# Patient Record
Sex: MALE | Race: WHITE | HISPANIC OR LATINO
[De-identification: names, ages, dates, MRNs, and addresses within clinical notes are randomized per-mention and may not be internally consistent; named-entity substitution may affect disease eponyms.]

---

## 2019-07-16 ENCOUNTER — RX ONLY (OUTPATIENT)
Age: 29
Setting detail: RX ONLY
End: 2019-07-16

## 2019-07-16 RX ORDER — TRIAMCINOLONE ACETONIDE 1 MG/G
1 CREAM TOPICAL BID
Qty: 1 | Refills: 1 | Status: ERX | COMMUNITY
Start: 2019-07-16

## 2019-10-26 ENCOUNTER — APPOINTMENT (RX ONLY)
Dept: URBAN - METROPOLITAN AREA CLINIC 45 | Facility: CLINIC | Age: 29
Setting detail: DERMATOLOGY
End: 2019-10-26

## 2019-10-26 DIAGNOSIS — L50.8 OTHER URTICARIA: ICD-10-CM | Status: INADEQUATELY CONTROLLED

## 2019-10-26 PROCEDURE — ? TREATMENT REGIMEN

## 2019-10-26 PROCEDURE — ? COUNSELING

## 2019-10-26 PROCEDURE — ? FULL BODY SKIN EXAM - DECLINED

## 2019-10-26 PROCEDURE — ? PRESCRIPTION

## 2019-10-26 PROCEDURE — ? MEDICATION COUNSELING

## 2019-10-26 PROCEDURE — 99214 OFFICE O/P EST MOD 30 MIN: CPT

## 2019-10-26 RX ORDER — TRIAMCINOLONE ACETONIDE 1 MG/G
1 CREAM TOPICAL BID
Qty: 1 | Refills: 1 | Status: ERX

## 2019-10-26 ASSESSMENT — LOCATION DETAILED DESCRIPTION DERM
LOCATION DETAILED: LEFT LATERAL ABDOMEN
LOCATION DETAILED: RIGHT LATERAL ABDOMEN

## 2019-10-26 ASSESSMENT — LOCATION SIMPLE DESCRIPTION DERM: LOCATION SIMPLE: ABDOMEN

## 2019-10-26 ASSESSMENT — LOCATION ZONE DERM: LOCATION ZONE: TRUNK

## 2019-10-26 NOTE — HPI: RASH
What Type Of Note Output Would You Prefer (Optional)?: Bullet Format
How Severe Is Your Rash?: moderate
Is This A New Presentation, Or A Follow-Up?: Rash
Additional History: Last OV w/ [facility]. Patient has not seen CHARLEEN in past.  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   Hx Asteatotic derm on the arms tx w/ TAC 0.1% cream and hydroxyzine HCL 25mg.  Patient states Zyrtec helps rash, but after about 3 hours the rash returns.

## 2019-10-26 NOTE — PROCEDURE: MIPS QUALITY
Quality 431: Preventive Care And Screening: Unhealthy Alcohol Use - Screening: Patient screened for unhealthy alcohol use using a single question and scores less than 2 times per year
Quality 402: Tobacco Use And Help With Quitting Among Adolescents: Patient screened for tobacco and never smoked
Quality 110: Preventive Care And Screening: Influenza Immunization: Influenza immunization was not ordered or administered, reason not given
Quality 226: Preventive Care And Screening: Tobacco Use: Screening And Cessation Intervention: Patient screened for tobacco use and is an ex/non-smoker
Detail Level: Detailed
Quality 130: Documentation Of Current Medications In The Medical Record: Current Medications Documented

## 2019-10-26 NOTE — PROCEDURE: TREATMENT REGIMEN
Detail Level: Zone
Continue Regimen: Zyrtec 10mg 1 po BID x 1 month. Patient may take 2 po BID x 1 month if rash continues to worsen.

## 2019-10-26 NOTE — PROCEDURE: MEDICATION COUNSELING
Xelkatez Pregnancy And Lactation Text: This medication is Pregnancy Category D and is not considered safe during pregnancy.  The risk during breast feeding is also uncertain.

## 2019-10-26 NOTE — PROCEDURE: MEDICATION COUNSELING
yes High Dose Vitamin A Pregnancy And Lactation Text: High dose vitamin A therapy is contraindicated during pregnancy and breast feeding.

## 2020-05-05 ENCOUNTER — RX ONLY (OUTPATIENT)
Age: 30
Setting detail: RX ONLY
End: 2020-05-05

## 2020-05-05 RX ORDER — TACROLIMUS 1 MG/G
OINTMENT TOPICAL
Qty: 1 | Refills: 3 | Status: ERX | COMMUNITY
Start: 2020-05-05

## 2020-11-11 ENCOUNTER — APPOINTMENT (RX ONLY)
Dept: URBAN - METROPOLITAN AREA CLINIC 45 | Facility: CLINIC | Age: 30
Setting detail: DERMATOLOGY
End: 2020-11-11

## 2020-11-11 DIAGNOSIS — L20.89 OTHER ATOPIC DERMATITIS: ICD-10-CM | Status: INADEQUATELY CONTROLLED

## 2020-11-11 PROBLEM — L20.84 INTRINSIC (ALLERGIC) ECZEMA: Status: ACTIVE | Noted: 2020-11-11

## 2020-11-11 PROCEDURE — ? PRESCRIPTION MEDICATION MANAGEMENT

## 2020-11-11 PROCEDURE — ? MEDICATION COUNSELING

## 2020-11-11 PROCEDURE — ? FULL BODY SKIN EXAM - DECLINED

## 2020-11-11 PROCEDURE — ? ADDITIONAL NOTES

## 2020-11-11 PROCEDURE — ? PRESCRIPTION

## 2020-11-11 PROCEDURE — 99214 OFFICE O/P EST MOD 30 MIN: CPT

## 2020-11-11 PROCEDURE — ? COUNSELING

## 2020-11-11 RX ORDER — TRIAMCINOLONE ACETONIDE 1 MG/G
CREAM TOPICAL BID
Qty: 1 | Refills: 0 | Status: ERX

## 2020-11-11 RX ORDER — HYDROXYZINE HYDROCHLORIDE 25 MG/1
TABLET, FILM COATED ORAL
Qty: 30 | Refills: 2 | Status: ERX | COMMUNITY
Start: 2020-11-11

## 2020-11-11 RX ADMIN — HYDROXYZINE HYDROCHLORIDE: 25 TABLET, FILM COATED ORAL at 00:00

## 2020-11-11 ASSESSMENT — LOCATION SIMPLE DESCRIPTION DERM
LOCATION SIMPLE: RIGHT UPPER BACK
LOCATION SIMPLE: LEFT FOREARM
LOCATION SIMPLE: RIGHT FOREARM

## 2020-11-11 ASSESSMENT — LOCATION DETAILED DESCRIPTION DERM
LOCATION DETAILED: LEFT VENTRAL PROXIMAL FOREARM
LOCATION DETAILED: RIGHT SUPERIOR UPPER BACK
LOCATION DETAILED: RIGHT VENTRAL PROXIMAL FOREARM

## 2020-11-11 ASSESSMENT — LOCATION ZONE DERM
LOCATION ZONE: ARM
LOCATION ZONE: TRUNK

## 2020-11-11 NOTE — PROCEDURE: PRESCRIPTION MEDICATION MANAGEMENT
Detail Level: Zone
Render In Strict Bullet Format?: No
Otc Regimen: Sensitive dove, cerave, cetaphil. Allegra (one tab in the morning)

## 2020-11-11 NOTE — PROCEDURE: MEDICATION COUNSELING
no Niacinamide Counseling: I recommended taking niacin or niacinamide, also know as vitamin B3, twice daily. Recent evidence suggests that taking vitamin B3 (500 mg twice daily) can reduce the risk of actinic keratoses and non-melanoma skin cancers. Side effects of vitamin B3 include flushing and headache.

## 2020-11-11 NOTE — HPI: RASH
What Type Of Note Output Would You Prefer (Optional)?: Bullet Format
How Severe Is Your Rash?: mild
Is This A New Presentation, Or A Follow-Up?: Rash
Additional History: He has seasonal allergies\\nHe was treated for urticaria with dermatographism. \\nSaw CHARLEEN last

## 2023-11-13 NOTE — PROCEDURE: MEDICATION COUNSELING
Trigger point injections and occipital nerve block at greater and lesser branches    Date/Time: 11/13/2023 1:35 PM    Performed by: London Lucero DO  Authorized by: London Lucero DO    Consent:     Consent obtained:  Verbal    Consent given by:  Patient    Risks discussed:  Bleeding, infection, nerve damage, pain and poor cosmetic result    Alternatives discussed:  Alternative treatment  Universal protocol:     Procedure explained and questions answered to patient or proxy's satisfaction: yes      Immediately prior to procedure, a time out was called: yes      Patient identity confirmed:  Verbally with patient  Indications:     Indications:  Myofascial Syndrome and Occipital Neuralgia (greater and lesser branches)  Pre-procedure details:     Skin preparation:  ChloraPrep    Preparation: Patient was prepped and draped in the usual sterile fashion    Anesthesia:     Anesthesia method:  None  Procedure specific details:      bilateral occipital nerve (greater and lesser branches) identified based on landmark anatomy and palpation.   It was cleaned with chloraprep and dried.   After negative aspiration, 6 mL of lidocaine 2% was easily injected with 27G one inch needle at each for a total of 2 sites.  Injected in equal parts at each side.  Patient tolerated procedure without complication.   The patient noted numbness in the distribution of the nerve.      bilateral trigger points were identified based on anatomy/palpation.   This was located paracervical muscles, trapezius, rhomboids.    The area was cleared with chloraprep and dried.    After negative aspiration, 4 mL of lidocaine 2% was easily injected with 27G one inch needle at each for a total of 8 sites.  Injected in equal parts at each site.  Patient tolerated procedure without complication.   Patient noted decreased pain.    Lidocaine   NDC: [id number]  LOT: [id number]  Exp Date: 9/2026      Post-procedure details:     Procedure completion:  Tolerated well, no  immediate complications         Drysol Counseling:  I discussed with the patient the risks of drysol/aluminum chloride including but not limited to skin rash, itching, irritation, burning.

## 2024-05-09 NOTE — PROCEDURE: MEDICATION COUNSELING
-- DO NOT REPLY / DO NOT REPLY ALL --  -- Message is from [facility] ([facility]) --    Request Result  Is the patient currently having Emergent symptoms?: No    Which result are you requesting?: A1C    What is the full name of the provider that ordered the lab or test?: Pallavi Nunez    Caller Information         Type Contact Phone/Fax    05/09/2024 03:49 PM CDT Phone (Incoming) Dontrell Tan (Self) [phone] (M)            Alternative phone number: NONE    Clinic site name / Account # for ordering provider: [facility]    Can a detailed message be left?: Yes    Message Turnaround:         appointment   Doxycycline Counseling:  Patient counseled regarding possible photosensitivity and increased risk for sunburn.  Patient instructed to avoid sunlight, if possible.  When exposed to sunlight, patients should wear protective clothing, sunglasses, and sunscreen.  The patient was instructed to call the office immediately if the following severe adverse effects occur:  hearing changes, easy bruising/bleeding, severe headache, or vision changes.  The patient verbalized understanding of the proper use and possible adverse effects of doxycycline.  All of the patient's questions and concerns were addressed.

## 2024-06-05 NOTE — PROCEDURE: MEDICATION COUNSELING
Patient baseline mental status Topical Sulfur Applications Pregnancy And Lactation Text: This medication is Pregnancy Category C and has an unknown safety profile during pregnancy. It is unknown if this topical medication is excreted in breast milk.